# Patient Record
(demographics unavailable — no encounter records)

---

## 2024-12-04 NOTE — ASSESSMENT
[FreeTextEntry1] : This is a 72-year-old female presenting with new symptoms of GERD and dysphagia.  I explained to the patient and her  the meaning of GERD.  I recommend eating small meals and avoid laying down after eating.  I recommend she continues on omeprazole 40 mg daily, to be taken half hour before breakfast.  She has the prescription.  She is very concerned about "cancer".  I recommend endoscopy.  I explained to her risk benefits to an endoscopy.  Risk including but not limited to bleeding, perforation, infection and adverse medication reaction.  Questions were answered.  She stated understanding.  The procedure scheduled for tomorrow.

## 2024-12-04 NOTE — HISTORY OF PRESENT ILLNESS
[FreeTextEntry1] : Areli presents for follow-up visit.  She is accompanied by her .  She relates that she has a new symptom of severe heartburn symptoms manifesting as burning and sharp pain in her chest that goes up to her throat.  She relates of occasional dysphagia.  She was started recently on omeprazole 40 mg daily without complete relief.  She also has complaint of epigastric discomfort.  She states that she lost 5 pounds.  She is very concerned.  She is having good bowel movements on MiraLAX daily.  She denies NSAIDs.  She denies melena or rectal bleeding.  She underwent a screening colonoscopy April 17, 2018 which was unremarkable.  She denies family history of colon cancer or colon polyps.  She did not take her Xarelto today.

## 2024-12-04 NOTE — PHYSICAL EXAM

## 2025-02-14 NOTE — END OF VISIT
[FreeTextEntry3] :  This note was written by Maryan Sanchez on (February 13, 2025) acting as a medical scribe for Dr. Stanley This note was authored by the medical scribe for me. I have reviewed, edited, and revised the note as needed. I am in agreement with the exam findings, imaging findings, and treatment plan.  Antelmo Stanley MD

## 2025-02-14 NOTE — ASSESSMENT
[Denosumab Therapy] : Risks  and benefits of denosumab therapy were discussed with the patient including eczema, cellulitis, osteonecrosis of the jaw and atypical femur fractures [FreeTextEntry1] : 73 y/o female returns for a follow-up visit for osteoporosis.  Pt was previously treated with years of Actonel or Fosamax. Began Prolia 07/2016. Tolerating well. No thigh pain, no interval fx. No ONJ. BMD 09/2017 indicates osteopenia in the spine, stable osteoporosis in total hip, decreased osteoporosis in fem neck despite rx, normal prox. radius.  BMD results reviewed w/pt. BMD 09/2018 indicates stable osteopenia in the spine, improved osteopenia in total hip, improved osteoporosis in fem neck, normal prox. radius. BMD results reviewed w/pt. BMD 04/2020 indicates improved osteopenia in the spine not accurate due to arthritis L2, stable osteopenia in total hip, stable osteoporosis in fem neck, decreased normal prox. radius. BMD results reviewed w/pt. BMD 10/2021 indicates stable osteopenia in total hip, stable osteoporosis in femoral neck, and stable normal proximal radius. BMD results reviewed w/ pt. BMD 02/2024 indicates stable osteopenia in total hip, stable osteoporosis in fem neck, normal prox. radius.  BMD results reviewed w/pt. Continue Prolia ariana and bill.   Labs: June 2024 Creatinine: 1.18 Calcium: 9.4  Call Dr. Darlyn Tellez for labs. [phone]  Follow up in 6 months for Prolia

## 2025-02-14 NOTE — PROCEDURE
[FreeTextEntry1] : Bone mineral density February 9, 2024 Compared to 2021 Spine not performed Total hip -2.1 osteopenia no significant change Femoral neck -3.1 osteoporosis no significant change although still significantly improved versus lowest value 2017 Proximal radius -0.6 normal no significant change  Bone mineral density: 10/20/2021  Indication: vs. 2020 prior test showed bone loss radius only Spine: not performed Total hip: -2.2 osteopenia, no significant change Femoral neck: -3.1 osteoporosis, no significant change Proximal radius: -0.6 normal, no significant change  Bone mineral density April 30, 2020 Compared to 2018 Spine L1-L2 -1.2, osteopenia +13.1% but probably not accurate due to arthritis L2 Total hip -2.2, osteopenia, no significant change Femoral neck -3.1, osteoporosis, no significant change versus 2018 but +9.0% versus 2016 Proximal radius -0.4, normal -4.6%  Bone mineral density: 09/21/2018  Indication: vs. 2017 prior test showed bone loss Spine: (L1 to L2) -2.1 osteopenia, no significant change  Total hip: -2.3 osteopenia (+4.5%) Femoral neck: -3.2 osteoporosis (+13.2%) Proximal radius: 0.1 normal, no significant change   Bone mineral density September 13, 2017 indication: Assess response to medication spine 1.9, osteopenia, prior result -1.4 but change in analysis total hip 2.5, osteoporosis, no significant change femoral neck-3.7, osteoporosis, -6.4% proximal radius 0.0,  normal no prior  Repeat bone density 6/10/16 no fee Left femoral neck -3.5 Right femoral neck -2.9 Left  total hip -2.4 Right total hip -2.2

## 2025-02-14 NOTE — ASSESSMENT
[Denosumab Therapy] : Risks  and benefits of denosumab therapy were discussed with the patient including eczema, cellulitis, osteonecrosis of the jaw and atypical femur fractures [FreeTextEntry1] : 71 y/o female returns for a follow-up visit for osteoporosis.  Pt was previously treated with years of Actonel or Fosamax. Began Prolia 07/2016. Tolerating well. No thigh pain, no interval fx. No ONJ. BMD 09/2017 indicates osteopenia in the spine, stable osteoporosis in total hip, decreased osteoporosis in fem neck despite rx, normal prox. radius.  BMD results reviewed w/pt. BMD 09/2018 indicates stable osteopenia in the spine, improved osteopenia in total hip, improved osteoporosis in fem neck, normal prox. radius. BMD results reviewed w/pt. BMD 04/2020 indicates improved osteopenia in the spine not accurate due to arthritis L2, stable osteopenia in total hip, stable osteoporosis in fem neck, decreased normal prox. radius. BMD results reviewed w/pt. BMD 10/2021 indicates stable osteopenia in total hip, stable osteoporosis in femoral neck, and stable normal proximal radius. BMD results reviewed w/ pt. BMD 02/2024 indicates stable osteopenia in total hip, stable osteoporosis in fem neck, normal prox. radius.  BMD results reviewed w/pt. Continue Prolia ariana and bill.   Labs: June 2024 Creatinine: 1.18 Calcium: 9.4  Call Dr. Darlyn Tellez for labs. [phone]  Follow up in 6 months for Prolia

## 2025-02-14 NOTE — HISTORY OF PRESENT ILLNESS
[Alendronate (Fosomax)] : Alendronate [Denosumab (Prolia)] : Denosumab [FreeTextEntry1] :  Pt returns for a follow-up visit for osteoporosis. Pt began Prolia 07/2016. Pt had an endoscopy done and was diagnosed with a hiatal hernia and was started on Omeprazole. Pt had a fall during vacation, no fractures. Up to date with dentist. No major dental work planned.  Told of low bone density for many years. She was treated with either Fosamax or Actonel for many years. She did take a one-year holiday 2015 until 2016. Bone density 2016 shows significant discrepancy between R and L femoral neck but in general bone density has not responded well to previous medication. She still has a quite low bone density the femoral neck. Began Prolia 07/2016. Tolerating well. No thigh pain, no interval fx. No ONJ. BMD 09/2017 indicates osteopenia in the spine, stable osteoporosis in total hip, decreased osteoporosis in fem neck despite rx, normal prox. radius.  BMD results reviewed w/pt. BMD 09/2018 indicates stable osteopenia in the spine, improved osteopenia in total hip, improved osteoporosis in fem neck, normal prox. radius. BMD results reviewed w/pt. BMD 04/2020 indicates improved osteopenia in the spine not accurate due to arthritis L2, stable osteopenia in total hip, stable osteoporosis in fem neck, decreased normal prox. radius. BMD results reviewed w/pt. BMD 10/2021 indicates stable osteopenia in total hip, stable osteoporosis in femoral neck, and stable normal proximal radius. BMD results reviewed w/ pt. BMD 02/2024 indicates stable osteopenia in total hip, stable osteoporosis in fem neck, normal prox. radius.  BMD results reviewed w/pt.   Pt reports that she has been dx with cardiomyopathy. Symptoms, shortness of breath. Pt is following with Dr. Shine Smith. Details not available.  Pt previously went for routine CT, found blood clot in left kidney, pt is on blood thinner Xarelto. PMHx notable for recurrent kidney stones for several years. On potassium citrate for this. No recent colic.  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 No RT. Had chemoRx; carboplatin and Taxol. Routine echo for RBBB was normal but they noticed her heart is pumping to 45% of its capacity, it was not due to the chemo pt had. Cardiologist is Dr. Shine Smith, pt on Losartan.  Prior labs Covid antibody positive but no h/o clinical disease.

## 2025-02-19 NOTE — PHYSICAL EXAM
[Alert] : alert [Normal Voice/Communication] : normal voice/communication [Healthy Appearing] : healthy appearing [No Acute Distress] : no acute distress [No Respiratory Distress] : no respiratory distress [No Acc Muscle Use] : no accessory muscle use [Respiration, Rhythm And Depth] : normal respiratory rhythm and effort [Heart Rate And Rhythm] : heart rate was normal and rhythm regular [Auscultation Breath Sounds / Voice Sounds] : lungs were clear to auscultation bilaterally [Normal S1, S2] : normal S1 and S2 [Murmurs] : no murmurs [Bowel Sounds] : normal bowel sounds [Abdomen Tenderness] : non-tender [No Masses] : no abdominal mass palpated [Abdomen Soft] : soft [] : no hepatosplenomegaly [Oriented To Time, Place, And Person] : oriented to person, place, and time

## 2025-02-19 NOTE — ASSESSMENT
[FreeTextEntry1] : This is a 72-year-old female with a history of chronic GERD, dysphagia, osteoporosis who presents today with c/o worsening heartburn. She reports that last night she was unable to sleep despite taking Omeprazole 40 mg daily. She admits to eating tomato sauce yesterday for dinner at about 5 pm. She reports burning in her chest. She also reports that since starting omeprazole 40 mg about 3 months ago, she started having more frequent headache. She reports a history of migraines which had improved after treatment with Botox. She also complains of dizziness and difficulty climbing up stairs. She denies any abdominal pain, nausea or vomiting. She denies any dysphagia or odynophagia. She reports in the past when she tried to wean off omeprazole her symptoms worsened. Patient underwent an upper endoscopy with Dr. Robledo on December 5, 2025, which showed a small hiatal hernia and gastritis. Path report was negative for h. pylori, gastric biopsy revealed mild chronic inactive gastritis, negative for intestinal metaplasia.   Recommendations  Discussed with patient that headache is a side effect of omeprazole. Long term use of PPIs can also increase risk of bone loss and osteoporosis. Given that patient has noticed increase frequency of headaches since starting omeprazole, I recommend weaning off omeprazole and start famotidine 20 mg twice daily. Patient does have a history of osteoporosis, treated with Prolia. Patient states that her endocrinologist advised that she could continue omeprazole since she has been taking it for some time. Patient is hesitant to stop medication and is requesting a trial of Omeprazole 20 mg BID instead. I recommend in addition to omeprazole and trial of famotidine 20 mg at bedtime. Reviewed with patient common foods that are known to worsen GERD symptoms and should be avoided. Foods included but not limited to tomato paste/sauce, fried and spicy foods, citrus fruits, peppermint, chocolate, caffeinated beverages, alcohol. Patient was counseled on the use of NSAIDs. I recommend that she eat dinner at least 3 hours before going to bed. I recommend that she eats smaller meals. I recommend that she elevate her head when lying down.  For her headaches, I recommend that she drinks plenty of water to keep well hydrated. Discussed with patient that if she has worsening headaches or dizziness, she is to go to the ER to be evaluated. At that time, I will recommend that she stop PPI. Patient has an appt with cardiology next week and will discuss her symptoms to r/o cardiac sources.   Multiple questions answered. She states understanding and agrees with plan. Mrs. Guevara will call me in one week to provide an update.

## 2025-02-19 NOTE — HISTORY OF PRESENT ILLNESS
[FreeTextEntry1] : This is a 72-year-old female with a history of chronic GERD, dysphagia, osteoporosis who presents today with c/o worsening heartburn. She reports that last night she was unable to sleep despite taking Omeprazole 40 mg daily. She admits to eating tomato sauce yesterday for dinner at about 5 pm. She reports burning in her chest. She also reports that since starting omeprazole 40 mg about 3 months ago, she started having more frequent headache. She reports a history of migraines which had improved after treatment with Botox. She also complains of dizziness and difficulty climbing up stairs. She denies any abdominal pain, nausea or vomiting. She denies any dysphagia or odynophagia. She reports in the past when she tried to wean off omeprazole her symptoms worsened. Patient underwent an upper endoscopy with Dr. Robledo on December 5, 2025, which showed a small hiatal hernia and gastritis. Path report was negative for h. pylori, gastric biopsy revealed mild chronic inactive gastritis, negative for intestinal metaplasia.